# Patient Record
Sex: MALE | Race: OTHER | HISPANIC OR LATINO | URBAN - METROPOLITAN AREA
[De-identification: names, ages, dates, MRNs, and addresses within clinical notes are randomized per-mention and may not be internally consistent; named-entity substitution may affect disease eponyms.]

---

## 2022-07-06 ENCOUNTER — HOSPITAL ENCOUNTER (EMERGENCY)
Facility: HOSPITAL | Age: 22
Discharge: HOME | End: 2022-07-06
Admitting: EMERGENCY MEDICINE

## 2022-07-06 VITALS
DIASTOLIC BLOOD PRESSURE: 75 MMHG | HEART RATE: 69 BPM | TEMPERATURE: 97.8 F | OXYGEN SATURATION: 100 % | RESPIRATION RATE: 18 BRPM | SYSTOLIC BLOOD PRESSURE: 119 MMHG

## 2022-07-06 DIAGNOSIS — R04.0 RIGHT-SIDED EPISTAXIS: Primary | ICD-10-CM

## 2022-07-06 PROCEDURE — 99282 EMERGENCY DEPT VISIT SF MDM: CPT

## 2022-07-06 NOTE — Clinical Note
Matt Dominguez was seen and treated in our emergency department on 7/6/2022  Diagnosis:     Darwin Hernandez  may return to work on return date  He may return on this date: 07/06/2022         If you have any questions or concerns, please don't hesitate to call        Marston Schlatter, PA-C    ______________________________           _______________          _______________  Hospital Representative                              Date                                Time

## 2022-07-06 NOTE — ED PROVIDER NOTES
History  Chief Complaint   Patient presents with   Burnetta Dakin     Pt was at work and got a nose bleed, denies injury and his job sent him here to make sure he was ok      25 YOM with no significant PMH presents today for evaluation after a nosebleed at work  Pt reports he used to get nosebleeds as a child  Was at work today and his nose started to bleed  No injury to the nose or precipitating factor  Pt reports it stopped after about 15 minutes of pressure  Not actively bleeding now  No LH or dizziness with the nosebleed  None       History reviewed  No pertinent past medical history  History reviewed  No pertinent surgical history  History reviewed  No pertinent family history  I have reviewed and agree with the history as documented  E-Cigarette/Vaping     E-Cigarette/Vaping Substances     Social History     Tobacco Use    Smoking status: Current Some Day Smoker     Types: Cigarettes    Smokeless tobacco: Never Used   Substance Use Topics    Drug use: Yes     Types: Marijuana       Review of Systems   Constitutional: Negative for chills and fever  HENT: Positive for nosebleeds  Negative for ear pain and sore throat  Eyes: Negative for pain and visual disturbance  Respiratory: Negative for cough and shortness of breath  Cardiovascular: Negative for chest pain and palpitations  Gastrointestinal: Negative for abdominal pain and vomiting  Genitourinary: Negative for dysuria and hematuria  Musculoskeletal: Negative for arthralgias and back pain  Skin: Negative for color change and rash  Neurological: Negative for seizures and syncope  All other systems reviewed and are negative  Physical Exam  Physical Exam  Vitals and nursing note reviewed  Constitutional:       Appearance: He is well-developed  HENT:      Head: Normocephalic and atraumatic  Nose: Nose normal       Comments: No bleeding  No sign of injury inside right nare       Mouth/Throat:      Mouth: Mucous membranes are moist    Eyes:      Extraocular Movements: Extraocular movements intact  Conjunctiva/sclera: Conjunctivae normal    Cardiovascular:      Rate and Rhythm: Normal rate and regular rhythm  Heart sounds: No murmur heard  Pulmonary:      Effort: Pulmonary effort is normal  No respiratory distress  Breath sounds: Normal breath sounds  Abdominal:      Palpations: Abdomen is soft  Tenderness: There is no abdominal tenderness  Musculoskeletal:         General: Normal range of motion  Cervical back: Normal range of motion and neck supple  Skin:     General: Skin is warm and dry  Neurological:      Mental Status: He is alert  Vital Signs  ED Triage Vitals [07/06/22 0741]   Temperature Pulse Respirations Blood Pressure SpO2   97 8 °F (36 6 °C) 69 18 119/75 100 %      Temp Source Heart Rate Source Patient Position - Orthostatic VS BP Location FiO2 (%)   Oral -- Sitting Left arm --      Pain Score       --           Vitals:    07/06/22 0741   BP: 119/75   Pulse: 69   Patient Position - Orthostatic VS: Sitting         Visual Acuity      ED Medications  Medications - No data to display    Diagnostic Studies  Results Reviewed     None                 No orders to display              Procedures  Procedures         ED Course                                             MDM  Number of Diagnoses or Management Options  Right-sided epistaxis  Diagnosis management comments: 25 YOM presents after nosebleed at work  Was sent here for eval  Pt not bleeding on arrival  No signs of injury  Will discharge home and pt can return to work today  I have discussed the plan to discharge pt from ED   The patient was discharged in stable condition   Patient ambulated off the department   Extensive return to emergency department precautions were discussed   Follow up with appropriate providers including primary care physician was discussed   Patient and/or their Gopi Diggs decision maker expressed understanding  [facility] [place] [facility] during entire emergency department stay  Amount and/or Complexity of Data Reviewed  Decide to obtain previous medical records or to obtain history from someone other than the patient: yes  Review and summarize past medical records: yes    Patient Progress  Patient progress: stable      Disposition  Final diagnoses:   Right-sided epistaxis     Time reflects when diagnosis was documented in both MDM as applicable and the Disposition within this note     Time User Action Codes Description Comment    7/6/2022  7:55 AM Christo Lopez Add [R04 0] Right-sided epistaxis       ED Disposition     ED Disposition   Discharge    Condition   Stable    Date/Time   Wed Jul 6, 2022  7:55 AM    Comment   Pastora Murdock discharge to home/self care  Follow-up Information     Follow up With Specialties Details Why Contact Info Additional [address] Emergency Department Emergency Medicine  If symptoms worsen [place] [phone] [address] [facility] Emergency Department, [address] [place] [address], [facility]          Patient's Medications    No medications on file       No discharge procedures on file      PDMP Review     None          ED Provider  Electronically Signed by           Brittny Goldman PA-C  07/06/22 6991

## 2022-07-06 NOTE — ED NOTES
Pt assessed and discharged by Provider prior to this user's nursing assessment         Britni Merlos  07/06/22 0800